# Patient Record
Sex: FEMALE | Race: WHITE | NOT HISPANIC OR LATINO | Employment: FULL TIME | URBAN - METROPOLITAN AREA
[De-identification: names, ages, dates, MRNs, and addresses within clinical notes are randomized per-mention and may not be internally consistent; named-entity substitution may affect disease eponyms.]

---
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
## 2022-07-25 ENCOUNTER — OFFICE VISIT (OUTPATIENT)
Dept: INTERNAL MEDICINE | Age: 22
End: 2022-07-25

## 2022-07-25 VITALS
BODY MASS INDEX: 32.92 KG/M2 | DIASTOLIC BLOOD PRESSURE: 80 MMHG | SYSTOLIC BLOOD PRESSURE: 112 MMHG | WEIGHT: 185.8 LBS | TEMPERATURE: 97.1 F | HEART RATE: 71 BPM | OXYGEN SATURATION: 99 % | HEIGHT: 63 IN

## 2022-07-25 DIAGNOSIS — N92.6 IRREGULAR PERIODS: ICD-10-CM

## 2022-07-25 DIAGNOSIS — Z91.09 ENVIRONMENTAL ALLERGIES: ICD-10-CM

## 2022-07-25 DIAGNOSIS — H69.93 DISORDER OF BOTH EUSTACHIAN TUBES: ICD-10-CM

## 2022-07-25 DIAGNOSIS — Z00.00 ANNUAL PHYSICAL EXAM: Primary | ICD-10-CM

## 2022-07-25 DIAGNOSIS — Z11.59 NEED FOR HEPATITIS C SCREENING TEST: ICD-10-CM

## 2022-07-25 PROBLEM — Z80.3 FAMILY HX-BREAST MALIGNANCY: Status: ACTIVE | Noted: 2021-07-19

## 2022-07-25 PROCEDURE — 99213 OFFICE O/P EST LOW 20 MIN: CPT | Performed by: NURSE PRACTITIONER

## 2022-07-25 PROCEDURE — 99395 PREV VISIT EST AGE 18-39: CPT | Performed by: NURSE PRACTITIONER

## 2022-07-25 RX ORDER — METHYLPREDNISOLONE 4 MG/1
TABLET ORAL
Qty: 21 TABLET | Refills: 0 | Status: SHIPPED | OUTPATIENT
Start: 2022-07-25 | End: 2022-07-26 | Stop reason: SDUPTHER

## 2022-07-25 NOTE — PROGRESS NOTES
"[facility] INTERNAL MEDICINE  ErisMIGUEL Eldridge Bevel / 21 y.o. / female  07/25/2022      VITALS     Visit Vitals  /80 (Cuff Size: Adult)   Pulse 71   Temp 97.1 °F (36.2 °C) (Temporal)   Ht 160 cm (63\")   Wt 84.3 kg (185 lb 12.8 oz)   LMP 07/07/2022 (Exact Date)   SpO2 99%   Breastfeeding No   BMI 32.91 kg/m²       BP Readings from Last 3 Encounters:   07/25/22 112/80   07/13/22 122/70   03/29/22 126/84     Wt Readings from Last 3 Encounters:   07/25/22 84.3 kg (185 lb 12.8 oz) (96 %, Z= 1.70)*   08/14/21 77.1 kg (170 lb) (92 %, Z= 1.44)*   10/31/20 72.1 kg (159 lb) (89 %, Z= 1.24)*     * Growth percentiles are based on CDC (Girls, 2-20 Years) data.      Body mass index is 32.91 kg/m².    MEDICATIONS     Current Outpatient Medications   Medication Sig Dispense Refill   • albuterol sulfate  (90 Base) MCG/ACT inhaler Inhale 2 puffs by mouth every 4-6 hours as needed for wheeze, cough, or shortness of air, and 20-30 minutes before exercise 8.5 g 1   • benzonatate (TESSALON) 200 MG capsule Take 1 capsule by mouth 3 (Three) Times a Day As Needed for Cough. 20 capsule 0   • fluticasone (FLONASE) 50 MCG/ACT nasal spray Use 2 sprays into the nostril(s) as directed by provider Daily. 16 g 0   • naproxen sodium (ANAPROX) 550 MG tablet Take 1 tablet by mouth 2 (Two) Times a Day With Meals. 15 tablet 0   • Norethin-Eth Estrad-Fe Biphas (Lo Loestrin Fe) 1 MG-10 MCG / 10 MCG tablet Take 1 tablet by mouth daily. 84 tablet 5   • pseudoephedrine (Sudafed) 30 MG tablet Take 2 tablets by mouth 3 (Three) Times a Day. 24 tablet 0   • Clascoterone (Winlevi) 1 % cream Apply a thin layer to face twice a day. 60 g 1   • methylPREDNISolone (MEDROL) 4 MG dose pack Take as directed on package instructions. 21 tablet 0     No current facility-administered medications for this visit.       _____________________________________________________________________________________    CHIEF COMPLAINT     Annual Exam, Establish " Care, and Ear Fullness      HISTORY OF PRESENT ILLNESS     Yaritza presents for annual health maintenance visit.    · Last health maintenance visit: more than 3 years ago  · General health: good  · Lifestyle:  · Attempting to lose weight?: Yes   · Diet: eats moderately healthy  · Exercise: has not been as active recently  · Tobacco: Never used   · Alcohol: does not drink  · Work: Full-time  · Reproductive health:  · Sexually active?: No   · Sexual problems?: No problems  · Concern for STD?: No    · Sees Gynecologist?: Yes   · Rosario/Postmenopausal?: No   · Domestic abuse concerns: No   · Depression Screening:      PHQ-2/PHQ-9 Depression Screening 7/25/2022   Little Interest or Pleasure in Doing Things 2-->more than half the days   Feeling Down, Depressed or Hopeless 0-->not at all   Trouble Falling or Staying Asleep, or Sleeping Too Much 1-->several days   Feeling Tired or Having Little Energy 1-->several days   Poor Appetite or Overeating 0-->not at all   Feeling Bad about Yourself - or that You are a Failure or Have Let Yourself or Your Family Down 0-->not at all   Trouble Concentrating on Things, Such as Reading the Newspaper or Watching Television 1-->several days   Moving or Speaking So Slowly that Other People Could Have Noticed? Or the Opposite - Being So Fidgety 0-->not at all   Thoughts that You Would be Better Off Dead or of Hurting Yourself in Some Way 0-->not at all   PHQ-9: Brief Depression Severity Measure Score 5   If You Checked Off Any Problems, How Difficult Have These Problems Made It For You to Do Your Work, Take Care of Things at Home, or Get Along with Other People? somewhat difficult         PHQ-2: 1 (Not depressed)   PHQ-9: 5-9 (Mild Depression)    Patient Care Team:  Eris Man APRN as PCP - General (Internal Medicine)  Lachelle Lezama MD (Obstetrics)  Salome Liz MD (Dermatology)  ______________________________________________________________________    ALLERGIES  Allergies   Allergen  Reactions   • Zithromax [Azithromycin] Swelling        PFSH:     The following portions of the patient's history were reviewed and updated as appropriate: Allergies / Current Medications / Past Medical History / Surgical History / Social History / Family History    PROBLEM LIST   Patient Active Problem List   Diagnosis   • Family hx-breast malignancy   • Irregular periods       PAST MEDICAL HISTORY  Past Medical History:   Diagnosis Date   • Allergic        SURGICAL HISTORY  Past Surgical History:   Procedure Laterality Date   • WISDOM TOOTH EXTRACTION         SOCIAL HISTORY  Social History     Socioeconomic History   • Marital status: Single   Tobacco Use   • Smoking status: Never Smoker   • Smokeless tobacco: Never Used   Vaping Use   • Vaping Use: Never used   Substance and Sexual Activity   • Alcohol use: Never   • Drug use: Never   • Sexual activity: Never       FAMILY HISTORY  Family History   Problem Relation Age of Onset   • Hypertension Mother    • Migraines Mother    • Melanoma Mother    • No Known Problems Father    • Asthma Brother    • Cancer Maternal Grandmother    • Heart disease Paternal Grandfather        IMMUNIZATION HISTORY  Immunization History   Administered Date(s) Administered   • COVID-19 (PFIZER) PURPLE CAP 07/24/2021, 08/14/2021   • Tdap 07/09/2022       ______________________________________________________________________    REVIEW OF SYSTEMS     Review of Systems   Constitutional: Negative.    HENT: Positive for ear pain and hearing loss.    Eyes: Negative.    Respiratory: Negative.    Cardiovascular: Negative.    Gastrointestinal: Negative.    Endocrine: Negative.    Genitourinary: Positive for menstrual problem.   Musculoskeletal: Positive for back pain.   Skin: Negative.    Allergic/Immunologic: Positive for environmental allergies.   Neurological: Negative.    Hematological: Negative.    Psychiatric/Behavioral: Positive for dysphoric mood and sleep disturbance. Negative for  self-injury and suicidal ideas. The patient is nervous/anxious.      PHYSICAL EXAMINATION     Physical Exam  Constitutional:       Appearance: Normal appearance.   HENT:      Head: Normocephalic and atraumatic.      Ears:      Comments: Eustachian disorder bilateral      Nose: Nose normal. No congestion.      Mouth/Throat:      Mouth: Mucous membranes are moist.      Pharynx: Oropharynx is clear.   Eyes:      Conjunctiva/sclera: Conjunctivae normal.      Pupils: Pupils are equal, round, and reactive to light.   Neck:      Thyroid: No thyromegaly.      Vascular: No carotid bruit.   Cardiovascular:      Rate and Rhythm: Normal rate and regular rhythm.      Pulses: Normal pulses.      Heart sounds: Normal heart sounds.   Pulmonary:      Effort: Pulmonary effort is normal.      Breath sounds: Normal breath sounds.   Abdominal:      General: There is no distension.      Palpations: Abdomen is soft.      Tenderness: There is no abdominal tenderness. There is no right CVA tenderness, left CVA tenderness or guarding.   Genitourinary:     Comments: Followed by OB/GYN  Musculoskeletal:         General: Normal range of motion.      Cervical back: Normal range of motion.      Right lower leg: No edema.      Left lower leg: No edema.   Lymphadenopathy:      Cervical: No cervical adenopathy.   Skin:     General: Skin is warm and dry.   Neurological:      Mental Status: She is alert and oriented to person, place, and time.      Cranial Nerves: No cranial nerve deficit.      Motor: No weakness.      Coordination: Coordination normal.      Gait: Gait normal.      Deep Tendon Reflexes: Babinski sign absent on the right side. Babinski sign absent on the left side.      Reflex Scores:       Patellar reflexes are 2+ on the right side and 2+ on the left side.  Psychiatric:         Mood and Affect: Mood normal.         Behavior: Behavior normal.         Thought Content: Thought content normal.         Judgment: Judgment normal.        REVIEWED DATA      Labs:    No results found for: NA, K, CALCIUM, AST, ALT, BUN, CREATININE, EGFRIFNONA, EGFRIFAFRI    No results found for: GLU, HGBA1C, TSH, FREET4    No results found for: LDL, HDL, TRIG, CHOLHDLRATIO    No results found for: TJRC17JE     Lab Results   Component Value Date    WBC 6.24 07/19/2021    HGB 13.6 07/19/2021    MCV 95.4 07/19/2021     07/19/2021       No results found for: PROTEIN, GLUCOSEU, BLOODU, NITRITEU, LEUKOCYTESUR     No results found for: HEPCVIRUSABY    Imaging:        Medical Tests:        ASSESSMENT & PLAN     ANNUAL WELLNESS EXAM / PHYSICAL     Other medical problems addressed today:  Annual physical and to establish care new provider.      No surgical history, history of environmental allergies.    Family history of hypertension, migraines, breast cancer maternal grandmother, heart disease in paternal grandfather.    Never smoker, no alcohol use no drug use.    Take Sudafed, Flonase and albuterol as needed for environmental allergies.    Intermittent lumbar pain, with right-sided radiculopathy.  Has been seen by chiropractor, however due to cost she is discontinued.  Declines physical therapy referral at this time.  Decreasing hours at work, would like to see if this will help first.    Dysphoric mood and anxiety, mild.  No thoughts of suicide or self-harm a panic attack.  Declines medication treatment or therapy at this time.  Would like to work on sleep hygiene as she believes this is the root cause of her symptoms.    Followed by Lachelle Lezama OB/GYN, on Loestrin FE daily with irregular periods.  Family history of breast cancer.  Pelvic ultrasound anteverted UT with normal contour, no fibroids, right ovary simple cyst.  July 2021, lipid panel was performed, CBC and TSH with irregular menses.  , triglycerides of 191 fasting labs.  CBC unremarkable.  TSH 1.690 in normal range.  Never sexually active.    Summary/Discussion:     · Provided lower back  exercises  · Medrol pack for eustachian tube disorder, if no improvement will refer to ENT as she has been on Flonase, Sudafed.  · Decrease/eliminate soda, caffeine, alcohol and overall caloric intake. Reduce carbohydrates and sweets in diet.  Continue to improve dietary habits with lean proteins, fresh vegetables, fruits, and nuts. Improve aerobic exercise: walking/biking/swimming daily as tolerated, recommend 30 minutes/day at least 5 days/week.  · Update annual fasting labs today.  · Declines vaccinations    Next Appointment with me: Visit date not found    Return in about 1 year (around 7/25/2023) for Annual physical.      HEALTHCARE MAINTENANCE ISSUES     Cancer Screening:  · Colon: Initial/Next screening at age: 47  · Repeat colon cancer screening: N/A at this time  · Breast: Recommended monthly self exams; annual professional exam  · Mammogram: N/A at this time  · Cervical: starts at 21   · Skin: Monthly self skin examination, annual exam by health professional  · Lung: Does not meet criteria for lung cancer screening.   · Other:    Screening Labs & Tests:  · Lab results reviewed & discussed with the patient or test orders placed today.  · EKG:  · CV Screening: Lipid panel  · DEXA (65+ or postmenopausal with risk factors):   · HEP C (If born [birth year]27[birth year], or risk factors): Negative screen  · Other:     Immunization/Vaccinations (to be given today unless deferred by patient)  · Influenza:   · Hepatitis A:   · Tetanus/Pertussis:   · Pneumovax:   · Shingles:   · Other:     Lifestyle Counseling:  · Lifestyle Modifications:   · Safety Issues: Always wear seatbelt, Avoid texting while driving   · Use sunscreen, regular skin examination  · Recommended annual dental/vision examination.  · Emotional/Stress/Sleep: Reviewed and  given when appropriate      Health Maintenance   Topic Date Due   • HEPATITIS C SCREENING  Never done   • HPV VACCINES (1 - 2-dose series) 07/25/2022 (Originally 9/20/2013)   • COVID-19  Vaccine (3 - Booster for Pfizer series) 07/27/2022 (Originally 1/14/2022)   • INFLUENZA VACCINE  10/01/2022   • ANNUAL PHYSICAL  07/26/2023   • TDAP/TD VACCINES (2 - Td or Tdap) 07/09/2032   • Pneumococcal Vaccine 0-64  Aged Out   • MENINGOCOCCAL VACCINE  Aged Out         Examiner was wearing KN95 mask, face shield and exam gloves during the entire duration of the visit. Patient was masked the entire time.   Minimum social distance of 6 ft maintained entire visit except if physical contact was necessary as documented.     **Dragon Disclaimer:   Much of this encounter note is an electronic transcription/translation of spoken language to printed text. The electronic translation of spoken language may permit erroneous, or at times, nonsensical words or phrases to be inadvertently transcribed. Although I have reviewed the note for such errors, some may still exist.

## 2022-07-26 NOTE — TELEPHONE ENCOUNTER
Caller: Yaritza Thomas    Relationship: Self    Best call back number: [phone]     Requested Prescriptions:   Requested Prescriptions     Pending Prescriptions Disp Refills   • methylPREDNISolone (MEDROL) 4 MG dose pack 21 tablet 0     Sig: Take as directed on package instructions.        Pharmacy where request should be sent: [facility] [id number] [place] [address] AT [facility] [place] - [phone]  - [phone] FX     Additional details provided by patient: PATIENT STATED THAT THE PREVIOUS REFILL WAS SENT TO A PHARMACY THAT SHE IS UNABLE TO GET TO ANYTIME SOON, WOULD LIKE TO HAVE IT SENT TO LISTED PHARMACY INSTEAD.    Does the patient have less than a 3 day supply:  [x] Yes  [] No    Cintia Vargas Rep   07/26/22 11:52 EDT

## 2022-07-27 ENCOUNTER — PATIENT ROUNDING (BHMG ONLY) (OUTPATIENT)
Dept: INTERNAL MEDICINE | Age: 22
End: 2022-07-27

## 2022-07-27 NOTE — PROGRESS NOTES
A My-Chart message has been sent to the patient for PATIENT ROUNDING with [facility]

## 2022-07-28 DIAGNOSIS — N39.0 URINARY TRACT INFECTION WITHOUT HEMATURIA, SITE UNSPECIFIED: Primary | ICD-10-CM

## 2022-07-28 LAB
ALBUMIN SERPL-MCNC: 4.7 G/DL (ref 3.9–5)
ALBUMIN/GLOB SERPL: 1.7 {RATIO} (ref 1.2–2.2)
ALP SERPL-CCNC: 100 IU/L (ref 42–106)
ALT SERPL-CCNC: 29 IU/L (ref 0–32)
APPEARANCE UR: ABNORMAL
AST SERPL-CCNC: 17 IU/L (ref 0–40)
BACTERIA #/AREA URNS HPF: ABNORMAL /[HPF]
BACTERIA UR CULT: NORMAL
BACTERIA UR CULT: NORMAL
BASOPHILS # BLD AUTO: 0.1 X10E3/UL (ref 0–0.2)
BASOPHILS NFR BLD AUTO: 1 %
BILIRUB SERPL-MCNC: 0.4 MG/DL (ref 0–1.2)
BILIRUB UR QL STRIP: NEGATIVE
BUN SERPL-MCNC: 14 MG/DL (ref 6–20)
BUN/CREAT SERPL: 17 (ref 9–23)
CALCIUM SERPL-MCNC: 9.9 MG/DL (ref 8.7–10.2)
CASTS URNS MICRO: ABNORMAL
CASTS URNS QL MICRO: PRESENT /LPF
CHLORIDE SERPL-SCNC: 106 MMOL/L (ref 96–106)
CHOLEST SERPL-MCNC: 218 MG/DL (ref 100–169)
CHOLEST/HDLC SERPL: 4.8 RATIO (ref 0–4.4)
CO2 SERPL-SCNC: 23 MMOL/L (ref 20–29)
COLOR UR: YELLOW
CREAT SERPL-MCNC: 0.81 MG/DL (ref 0.57–1)
EGFRCR SERPLBLD CKD-EPI 2021: 107 ML/MIN/1.73
EOSINOPHIL # BLD AUTO: 0.1 X10E3/UL (ref 0–0.4)
EOSINOPHIL NFR BLD AUTO: 2 %
EPI CELLS #/AREA URNS HPF: ABNORMAL /HPF (ref 0–10)
ERYTHROCYTE [DISTWIDTH] IN BLOOD BY AUTOMATED COUNT: 11.9 % (ref 11.7–15.4)
GLOBULIN SER CALC-MCNC: 2.7 G/DL (ref 1.5–4.5)
GLUCOSE SERPL-MCNC: 92 MG/DL (ref 65–99)
GLUCOSE UR QL STRIP: NEGATIVE
HBA1C MFR BLD: 5.2 % (ref 4.8–5.6)
HCT VFR BLD AUTO: 40.9 % (ref 34–46.6)
HCV AB S/CO SERPL IA: 0.1 S/CO RATIO (ref 0–0.9)
HDLC SERPL-MCNC: 45 MG/DL
HGB BLD-MCNC: 14.2 G/DL (ref 11.1–15.9)
HGB UR QL STRIP: NEGATIVE
IMM GRANULOCYTES # BLD AUTO: 0 X10E3/UL (ref 0–0.1)
IMM GRANULOCYTES NFR BLD AUTO: 0 %
KETONES UR QL STRIP: NEGATIVE
LDLC SERPL CALC-MCNC: 159 MG/DL (ref 0–109)
LEUKOCYTE ESTERASE UR QL STRIP: ABNORMAL
LYMPHOCYTES # BLD AUTO: 2.1 X10E3/UL (ref 0.7–3.1)
LYMPHOCYTES NFR BLD AUTO: 34 %
MCH RBC QN AUTO: 31.1 PG (ref 26.6–33)
MCHC RBC AUTO-ENTMCNC: 34.7 G/DL (ref 31.5–35.7)
MCV RBC AUTO: 90 FL (ref 79–97)
MICRO URNS: ABNORMAL
MONOCYTES # BLD AUTO: 0.4 X10E3/UL (ref 0.1–0.9)
MONOCYTES NFR BLD AUTO: 7 %
MUCOUS THREADS URNS QL MICRO: PRESENT
NEUTROPHILS # BLD AUTO: 3.4 X10E3/UL (ref 1.4–7)
NEUTROPHILS NFR BLD AUTO: 56 %
NITRITE UR QL STRIP: NEGATIVE
PH UR STRIP: 5.5 [PH] (ref 5–7.5)
PLATELET # BLD AUTO: 332 X10E3/UL (ref 150–450)
POTASSIUM SERPL-SCNC: 4.5 MMOL/L (ref 3.5–5.2)
PROT SERPL-MCNC: 7.4 G/DL (ref 6–8.5)
PROT UR QL STRIP: NEGATIVE
RBC # BLD AUTO: 4.56 X10E6/UL (ref 3.77–5.28)
RBC #/AREA URNS HPF: ABNORMAL /HPF (ref 0–2)
SODIUM SERPL-SCNC: 142 MMOL/L (ref 134–144)
SP GR UR STRIP: 1.02 (ref 1–1.03)
T4 FREE SERPL-MCNC: 1.18 NG/DL (ref 0.93–1.6)
TRIGL SERPL-MCNC: 79 MG/DL (ref 0–89)
TSH SERPL DL<=0.005 MIU/L-ACNC: 1.49 UIU/ML (ref 0.45–4.5)
URINALYSIS REFLEX: ABNORMAL
UROBILINOGEN UR STRIP-MCNC: 0.2 MG/DL (ref 0.2–1)
VLDLC SERPL CALC-MCNC: 14 MG/DL (ref 5–40)
WBC # BLD AUTO: 6.1 X10E3/UL (ref 3.4–10.8)
WBC #/AREA URNS HPF: ABNORMAL /HPF (ref 0–5)

## 2022-07-28 RX ORDER — NITROFURANTOIN 25; 75 MG/1; MG/1
100 CAPSULE ORAL 2 TIMES DAILY
Qty: 10 CAPSULE | Refills: 0 | Status: SHIPPED | OUTPATIENT
Start: 2022-07-28 | End: 2022-08-02

## 2022-07-28 RX ORDER — METHYLPREDNISOLONE 4 MG/1
TABLET ORAL
Qty: 21 TABLET | Refills: 0 | Status: SHIPPED | OUTPATIENT
Start: 2022-07-28 | End: 2023-03-03

## 2022-07-29 ENCOUNTER — TELEPHONE (OUTPATIENT)
Dept: INTERNAL MEDICINE | Age: 22
End: 2022-07-29

## 2022-07-29 NOTE — TELEPHONE ENCOUNTER
HUB MAY READ TO PT  LAB APPT NEEDED. MM    ----- Message from MIGUEL Khan sent at 7/28/2022 12:00 PM EDT -----  Please make lab appointment for urine.     All labs are in acceptable ranges except for the following: It does look like you have a urinary tract infection, however no predominant bacteria was found.  I am going to send in Macrobid to treat, however I would recommend repeating a urine in 1 week for resolution.     Cholesterol is significantly elevated. Decrease/eliminate soda, caffeine, and overall caloric intake. Reduce carbohydrates and sweets in diet.  Continue to improve dietary habits with lean proteins, fresh vegetables, fruits, and nuts. Improve aerobic exercise: walking/biking/swimming daily as tolerated, recommend 30 minutes/day at least 5 days/week.

## 2023-02-02 DIAGNOSIS — R41.840 CONCENTRATION DEFICIT: Primary | ICD-10-CM

## 2023-03-03 ENCOUNTER — LAB (OUTPATIENT)
Dept: LAB | Facility: HOSPITAL | Age: 23
End: 2023-03-03
Payer: COMMERCIAL

## 2023-03-03 ENCOUNTER — OFFICE VISIT (OUTPATIENT)
Dept: INTERNAL MEDICINE | Age: 23
End: 2023-03-03
Payer: COMMERCIAL

## 2023-03-03 VITALS
HEART RATE: 92 BPM | TEMPERATURE: 97.1 F | BODY MASS INDEX: 34.55 KG/M2 | SYSTOLIC BLOOD PRESSURE: 122 MMHG | HEIGHT: 63 IN | OXYGEN SATURATION: 99 % | DIASTOLIC BLOOD PRESSURE: 82 MMHG | WEIGHT: 195 LBS

## 2023-03-03 DIAGNOSIS — M54.50 LUMBAR PAIN WITH RADIATION DOWN RIGHT LEG: ICD-10-CM

## 2023-03-03 DIAGNOSIS — R11.0 NAUSEA: ICD-10-CM

## 2023-03-03 DIAGNOSIS — R10.10 UPPER ABDOMINAL PAIN: ICD-10-CM

## 2023-03-03 DIAGNOSIS — N91.2 AMENORRHEA: Primary | ICD-10-CM

## 2023-03-03 DIAGNOSIS — M79.604 LUMBAR PAIN WITH RADIATION DOWN RIGHT LEG: ICD-10-CM

## 2023-03-03 LAB
ALBUMIN SERPL-MCNC: 4.6 G/DL (ref 3.5–5.2)
ALBUMIN/GLOB SERPL: 1.7 G/DL
ALP SERPL-CCNC: 100 U/L (ref 39–117)
ALT SERPL W P-5'-P-CCNC: 60 U/L (ref 1–33)
AMYLASE SERPL-CCNC: 46 U/L (ref 28–100)
ANION GAP SERPL CALCULATED.3IONS-SCNC: 7.2 MMOL/L (ref 5–15)
AST SERPL-CCNC: 27 U/L (ref 1–32)
BACTERIA UR QL AUTO: ABNORMAL /HPF
BILIRUB SERPL-MCNC: 0.4 MG/DL (ref 0–1.2)
BILIRUB UR QL STRIP: NEGATIVE
BUN SERPL-MCNC: 11 MG/DL (ref 6–20)
BUN/CREAT SERPL: 16.7 (ref 7–25)
CALCIUM SPEC-SCNC: 9.3 MG/DL (ref 8.6–10.5)
CHLORIDE SERPL-SCNC: 104 MMOL/L (ref 98–107)
CLARITY UR: CLEAR
CO2 SERPL-SCNC: 27.8 MMOL/L (ref 22–29)
COLOR UR: YELLOW
CREAT SERPL-MCNC: 0.66 MG/DL (ref 0.57–1)
EGFRCR SERPLBLD CKD-EPI 2021: 129 ML/MIN/1.73
GLOBULIN UR ELPH-MCNC: 2.7 GM/DL
GLUCOSE SERPL-MCNC: 90 MG/DL (ref 65–99)
GLUCOSE UR STRIP-MCNC: NEGATIVE MG/DL
HGB UR QL STRIP.AUTO: NEGATIVE
HYALINE CASTS UR QL AUTO: ABNORMAL /LPF
KETONES UR QL STRIP: NEGATIVE
LEUKOCYTE ESTERASE UR QL STRIP.AUTO: ABNORMAL
LIPASE SERPL-CCNC: 24 U/L (ref 13–60)
NITRITE UR QL STRIP: NEGATIVE
PH UR STRIP.AUTO: 7.5 [PH] (ref 5–8)
POTASSIUM SERPL-SCNC: 4.4 MMOL/L (ref 3.5–5.2)
PROT SERPL-MCNC: 7.3 G/DL (ref 6–8.5)
PROT UR QL STRIP: NEGATIVE
RBC # UR STRIP: ABNORMAL /HPF
REF LAB TEST METHOD: ABNORMAL
SODIUM SERPL-SCNC: 139 MMOL/L (ref 136–145)
SP GR UR STRIP: 1.01 (ref 1–1.03)
SQUAMOUS #/AREA URNS HPF: ABNORMAL /HPF
UROBILINOGEN UR QL STRIP: ABNORMAL
WBC # UR STRIP: ABNORMAL /HPF

## 2023-03-03 PROCEDURE — 36415 COLL VENOUS BLD VENIPUNCTURE: CPT | Performed by: NURSE PRACTITIONER

## 2023-03-03 PROCEDURE — 80053 COMPREHEN METABOLIC PANEL: CPT | Performed by: NURSE PRACTITIONER

## 2023-03-03 PROCEDURE — 87086 URINE CULTURE/COLONY COUNT: CPT | Performed by: NURSE PRACTITIONER

## 2023-03-03 PROCEDURE — 82150 ASSAY OF AMYLASE: CPT | Performed by: NURSE PRACTITIONER

## 2023-03-03 PROCEDURE — 99214 OFFICE O/P EST MOD 30 MIN: CPT | Performed by: NURSE PRACTITIONER

## 2023-03-03 PROCEDURE — 83690 ASSAY OF LIPASE: CPT | Performed by: NURSE PRACTITIONER

## 2023-03-03 PROCEDURE — 81001 URINALYSIS AUTO W/SCOPE: CPT | Performed by: NURSE PRACTITIONER

## 2023-03-03 RX ORDER — CHLORCYCLIZINE HYDROCHLORIDE AND PSEUDOEPHEDRINE HYDROCHLORIDE 25; 60 MG/1; MG/1
TABLET ORAL DAILY
COMMUNITY

## 2023-03-03 NOTE — PROGRESS NOTES
"    I N T E R N A L  M E D I C I N E  DENI ROOT, MIGUEL      ENCOUNTER DATE:  03/03/2023    Yaritza Thomas / 22 y.o. / female      CHIEF COMPLAINT / REASON FOR OFFICE VISIT     Abdominal Pain (Pain since 2/21/2023), Back Pain, and Amenorrhea      ASSESSMENT & PLAN     Problem List Items Addressed This Visit    None  Visit Diagnoses     Amenorrhea    -  Primary    Relevant Orders    Ambulatory Referral to Obstetrics / Gynecology    Lumbar pain with radiation down right leg        Relevant Orders    XR Spine Lumbar 2 or 3 View    Ambulatory Referral to Physical Therapy Evaluate and treat    Nausea        Relevant Orders    Lipase    Amylase    Comprehensive Metabolic Panel    US Abdomen Complete    Urinalysis With Culture If Indicated - Urine, Clean Catch    Upper abdominal pain        Relevant Orders    Lipase    Amylase    Comprehensive Metabolic Panel    US Abdomen Complete    Urinalysis With Culture If Indicated - Urine, Clean Catch        Orders Placed This Encounter   Procedures   • US Abdomen Complete   • XR Spine Lumbar 2 or 3 View   • Lipase   • Amylase   • Comprehensive Metabolic Panel   • Urinalysis With Culture If Indicated - Urine, Clean Catch   • Ambulatory Referral to Physical Therapy Evaluate and treat   • Ambulatory Referral to Obstetrics / Gynecology     No orders of the defined types were placed in this encounter.      SUMMARY/DISCUSSION  •       Next Appointment with me: 8/29/2023    No follow-ups on file.      VITAL SIGNS     Visit Vitals  /82 (Cuff Size: Adult)   Pulse 92   Temp 97.1 °F (36.2 °C) (Temporal)   Ht 160 cm (63\")   Wt 88.5 kg (195 lb)   LMP 12/15/2022 (Exact Date)   SpO2 99%   BMI 34.54 kg/m²     Wt Readings from Last 3 Encounters:   03/03/23 88.5 kg (195 lb)   07/25/22 84.3 kg (185 lb 12.8 oz) (96 %, Z= 1.70)*   08/14/21 77.1 kg (170 lb) (92 %, Z= 1.44)*     * Growth percentiles are based on CDC (Girls, 2-20 Years) data.     Body mass index is 34.54 kg/m².      MEDICATIONS AT THE " TIME OF OFFICE VISIT     Current Outpatient Medications on File Prior to Visit   Medication Sig   • albuterol sulfate  (90 Base) MCG/ACT inhaler Inhale 2 puffs by mouth every 4-6 hours as needed for wheeze, cough, or shortness of air, and 20-30 minutes before exercise   • fluticasone (FLONASE) 50 MCG/ACT nasal spray Use 2 sprays into the nostril(s) as directed by provider Daily.   • Chlorcyclizine-Pseudoephed (Stahist AD) 25-60 MG tablet Daily.   • [DISCONTINUED] benzonatate (TESSALON) 200 MG capsule Take 1 capsule by mouth 3 (Three) Times a Day As Needed for Cough.   • [DISCONTINUED] Clascoterone (Winlevi) 1 % cream Apply a thin layer to face twice a day.   • [DISCONTINUED] methylPREDNISolone (MEDROL) 4 MG dose pack Take as directed on package instructions.   • [DISCONTINUED] naproxen sodium (ANAPROX) 550 MG tablet Take 1 tablet by mouth 2 (Two) Times a Day With Meals.   • [DISCONTINUED] Norethin-Eth Estrad-Fe Biphas (Lo Loestrin Fe) 1 MG-10 MCG / 10 MCG tablet Take 1 tablet by mouth daily.   • [DISCONTINUED] pseudoephedrine (Sudafed) 30 MG tablet Take 2 tablets by mouth 3 (Three) Times a Day.     No current facility-administered medications on file prior to visit.          HISTORY OF PRESENT ILLNESS     Patient originally presented for abdominal pain but also has concerns for lower back pain along with amenorrhea.     Complaint of amenorrhea, not having sexual intercourse, would like referral to new OB/GYN.    Long-term back pain over the last 2 years at least, has seen by chiropractor in the past but this became too expensive.  Has shooting pain down her right lower extremity, around the buttocks with no lower extremity weakness, numbness or tingling or urinary or bowel incontinence.    Mid February she had abdominal cramping burning pain to bilateral upper quadrants.  History of cholelithiasis with gallbladder removal in mother.  No loose stools dark stools or blood in the stool.  Nausea associated with  abdominal pain, postprandial.  No vomiting.  No fever or chills.  No longer having pain.    REVIEW OF SYSTEMS     Constitutional neg except per HPI   Resp neg  CV neg  GI previous nausea, abdominal cramping and bilateral upper quadrant pain, postprandial pain  Musc lumbar pain with right lower extremity radiation   amenorrhea    PHYSICAL EXAMINATION     Physical Exam  Constitutional  No distress  Cardiovascular Rate  normal . Rhythm: regular . Heart sounds:  normal  Pulmonary/Chest  Effort normal. Breath sounds:  normal  Musc lumbar tenderness, neg lower extremity weakness   GI neg distention, tenderness, or rebound   Psychiatric  Alert. Judgment and thought content normal. Mood normal     REVIEWED DATA     Labs:   Lab Results   Component Value Date    GLUCOSE 92 07/25/2022    BUN 14 07/25/2022    CREATININE 0.81 07/25/2022    EGFRRESULT 107 07/25/2022    BCR 17 07/25/2022    K 4.5 07/25/2022    CO2 23 07/25/2022    CALCIUM 9.9 07/25/2022    PROTENTOTREF 7.4 07/25/2022    ALBUMIN 4.7 07/25/2022    LABIL2 1.7 07/25/2022    AST 17 07/25/2022    ALT 29 07/25/2022     Lab Results   Component Value Date    TSH 1.490 07/25/2022     Lab Results   Component Value Date    WBC 6.1 07/25/2022    HGB 14.2 07/25/2022    HCT 40.9 07/25/2022    MCV 90 07/25/2022     07/25/2022     Imaging:           Medical Tests:           Summary of old records / correspondence / consultant report:           Request outside records:             *Examiner was wearing medical surgical mask.   **Dragon dictation used for for documentation.

## 2023-03-04 LAB — BACTERIA SPEC AEROBE CULT: NORMAL

## 2023-03-16 ENCOUNTER — TELEPHONE (OUTPATIENT)
Dept: INTERNAL MEDICINE | Age: 23
End: 2023-03-16
Payer: COMMERCIAL

## 2023-03-16 NOTE — TELEPHONE ENCOUNTER
CALL 1;  [facility] ADVISING XRAY ORDER DID NOT HAVE TO BE SCHEDULED. PT INSTRUCTED TO HAVE XRAY OR CALL OFFICE TO CANCEL ORDER. MM

## 2024-08-26 ENCOUNTER — OFFICE VISIT (OUTPATIENT)
Dept: OBSTETRICS AND GYNECOLOGY | Facility: CLINIC | Age: 24
End: 2024-08-26
Payer: COMMERCIAL

## 2024-08-26 VITALS
WEIGHT: 198 LBS | HEIGHT: 63 IN | BODY MASS INDEX: 35.08 KG/M2 | DIASTOLIC BLOOD PRESSURE: 92 MMHG | SYSTOLIC BLOOD PRESSURE: 148 MMHG

## 2024-08-26 DIAGNOSIS — N89.8 VAGINAL DISCHARGE: ICD-10-CM

## 2024-08-26 DIAGNOSIS — Z01.419 CERVICAL SMEAR, AS PART OF ROUTINE GYNECOLOGICAL EXAMINATION: ICD-10-CM

## 2024-08-26 DIAGNOSIS — Z80.3 FAMILY HX-BREAST MALIGNANCY: ICD-10-CM

## 2024-08-26 DIAGNOSIS — N91.4 SECONDARY OLIGOMENORRHEA: ICD-10-CM

## 2024-08-26 DIAGNOSIS — Z01.419 ROUTINE GYNECOLOGICAL EXAMINATION: Primary | ICD-10-CM

## 2024-08-26 LAB
B-HCG UR QL: NEGATIVE
BILIRUB BLD-MCNC: NEGATIVE MG/DL
CLARITY, POC: CLEAR
COLOR UR: YELLOW
EXPIRATION DATE: NORMAL
GLUCOSE UR STRIP-MCNC: NEGATIVE MG/DL
INTERNAL NEGATIVE CONTROL: NORMAL
INTERNAL POSITIVE CONTROL: NORMAL
KETONES UR QL: NEGATIVE
LEUKOCYTE EST, POC: NEGATIVE
Lab: NORMAL
NITRITE UR-MCNC: NEGATIVE MG/ML
PH UR: 5 [PH] (ref 5–8)
PROT UR STRIP-MCNC: NEGATIVE MG/DL
RBC # UR STRIP: NEGATIVE /UL
SP GR UR: 1 (ref 1–1.03)
UROBILINOGEN UR QL: NORMAL

## 2024-08-26 PROCEDURE — 99214 OFFICE O/P EST MOD 30 MIN: CPT | Performed by: STUDENT IN AN ORGANIZED HEALTH CARE EDUCATION/TRAINING PROGRAM

## 2024-08-26 PROCEDURE — 81025 URINE PREGNANCY TEST: CPT | Performed by: STUDENT IN AN ORGANIZED HEALTH CARE EDUCATION/TRAINING PROGRAM

## 2024-08-26 PROCEDURE — 99385 PREV VISIT NEW AGE 18-39: CPT | Performed by: STUDENT IN AN ORGANIZED HEALTH CARE EDUCATION/TRAINING PROGRAM

## 2024-08-26 PROCEDURE — 81003 URINALYSIS AUTO W/O SCOPE: CPT | Performed by: STUDENT IN AN ORGANIZED HEALTH CARE EDUCATION/TRAINING PROGRAM

## 2024-08-26 RX ORDER — DROSPIRENONE AND ETHINYL ESTRADIOL 0.02-3(28)
1 KIT ORAL DAILY
Qty: 84 TABLET | Refills: 3 | Status: SHIPPED | OUTPATIENT
Start: 2024-08-26

## 2024-08-26 NOTE — ASSESSMENT & PLAN NOTE
Counseled patient that we could consider a workup for PCOS if she desires.  Would recommend follow-up TVUS.  She had one a couple years ago which showed a small follicular cyst but no overt mention of polycystic morphology.  Also discussed that we would conduct fasting lab draw at that same time.  She is interested in hormonal treatment for this at this time though, and discussed drospirenone containing OCPs to help address her acne.  Counseled on cyclic versus continuous dosing, and encouraged continuous dosing.  No contraindications to estrogen containing OCPs.

## 2024-08-26 NOTE — PROGRESS NOTES
GYN Annual Exam     CC- Here for annual exam.     Yaritza Thomas is a 23 y.o. female new patient who presents for annual well woman exam.  Her main symptoms today relate to her lack of regular menses.  She says this has been a lifelong issue for her but generally she will have periods lasting a full week every 5 to 6 months.  She does mention some irregular bleeding episodes in between these periods where she will have darker bloody discharge but for shorter durations.  For most of these bleeding episodes both dark and bright red, she will have some systemic symptoms including mood changes, breast soreness, and pelvic cramping.  She was previously on combined OCPs but found that she was not taking her pills regularly.  She has not seen yet sexually active and denies any other pelvic complaints at this time.  Has never had a PCOS workup; has had increased weight gain over the last couple years and elevated blood pressures today.  Saw dermatologist for acne but denies any other signs of hirsutism including coarse upper lip or chin hairs.  Has a family history of breast cancer but has not undergone any genetic testing to this point.  Non-smoker no recreational drug use.  Otherwise feels well today.      OB History          0    Para   0    Term   0       0    AB   0    Living   0         SAB   0    IAB   0    Ectopic   0    Molar   0    Multiple   0    Live Births   0                Current contraception: none  History of abnormal Pap smear: no  History of abnormal mammogram: no  Family history of uterine, colon or ovarian cancer: no  Family history of breast cancer: yes - maternal grandmother  H/o STDs: none  Last pap:never  Gardasil: Counseled on the Gardasil vaccine today.  Has never received and discussed that vaccination is recommended even without prior sexual activity in order to prevent against future infections.    Health Maintenance   Topic Date Due    Annual Gynecologic Pelvic and Breast Exam   Never done    Pneumococcal Vaccine 0-64 (1 of 2 - PCV) Never done    HPV VACCINES (1 - 3-dose series) Never done    PAP SMEAR  Never done    ANNUAL PHYSICAL  07/25/2023    COVID-19 Vaccine (3 - 2023-24 season) 09/01/2023    BMI FOLLOWUP  03/03/2024    INFLUENZA VACCINE  08/01/2024    TDAP/TD VACCINES (2 - Td or Tdap) 07/09/2032    HEPATITIS C SCREENING  Completed    MENINGOCOCCAL VACCINE  Aged Out       Past Medical History:   Diagnosis Date    Allergic     Headache        Past Surgical History:   Procedure Laterality Date    WISDOM TOOTH EXTRACTION           Current Outpatient Medications:     albuterol sulfate  (90 Base) MCG/ACT inhaler, Inhale 2 puffs by mouth every 4-6 hours as needed for wheeze, cough, or shortness of air, and 20-30 minutes before exercise, Disp: 8.5 g, Rfl: 1    Chlorcyclizine-Pseudoephed (Stahist AD) 25-60 MG tablet, Daily., Disp: , Rfl:     drospirenone-ethinyl estradiol (CAMRON,GIANVI) 3-0.02 MG per tablet, Take 1 tablet by mouth Daily. Continuous dosing, proceed to next pill-pack after completing active pills, Disp: 84 tablet, Rfl: 3    fluticasone (FLONASE) 50 MCG/ACT nasal spray, Use 2 sprays into the nostril(s) as directed by provider Daily., Disp: 16 g, Rfl: 0    Allergies   Allergen Reactions    Zithromax [Azithromycin] Swelling       Social History     Tobacco Use    Smoking status: Never    Smokeless tobacco: Never   Vaping Use    Vaping status: Never Used   Substance Use Topics    Alcohol use: Never    Drug use: Never       Family History   Problem Relation Age of Onset    No Known Problems Father     Hypertension Mother     Migraines Mother     Melanoma Mother     Cancer Mother         melanoma    Mental illness Mother         ADHD    Asthma Brother     Heart disease Paternal Grandfather     Diabetes Paternal Grandmother         Type 2    Breast cancer Maternal Grandmother     Cancer Maternal Grandmother         breast cancer    Hearing loss Maternal Grandfather        Review  "of Systems   Constitutional:  Negative for chills and fever.   Respiratory:  Negative for shortness of breath.    Cardiovascular:  Negative for chest pain.   Gastrointestinal:  Negative for abdominal pain, nausea and vomiting.   Genitourinary:  Positive for vaginal discharge. Negative for dysuria and pelvic pain.   Neurological:  Negative for headaches.       /92   Ht 160 cm (63\")   Wt 89.8 kg (198 lb)   LMP 07/02/2024 (Approximate)   BMI 35.07 kg/m²     Physical Exam  Constitutional:       General: She is not in acute distress.     Appearance: Normal appearance. She is not ill-appearing.   Eyes:      Pupils: Pupils are equal, round, and reactive to light.   Cardiovascular:      Rate and Rhythm: Normal rate.   Pulmonary:      Effort: Pulmonary effort is normal. No respiratory distress.   Abdominal:      General: Abdomen is flat. There is no distension.   Neurological:      General: No focal deficit present.      Mental Status: She is alert.   Psychiatric:         Mood and Affect: Mood normal.         Thought Content: Thought content normal.            Assessment/Plan    # GYN HM: pap    # STD screening: declines   # Contraception:  Initiate today, see below  # Family Planning: family planning: no plans at present  # Diet and Exercise discussed  # Smoking Status: No  # Social: n/a  # Mammogram- plan age 42  # Colon cancer screening 45  # Follow up 1 year or PRN       Diagnoses and all orders for this visit:    1. Routine gynecological examination (Primary)  Comments:  Routine annual exam completed today with Pap smear screening.  Patient not sexually active so gonorrhea & chlamydia screening deferred.  Orders:  -     POC Urinalysis Dipstick, Multipro  -     POC Pregnancy, Urine  -     IGP,CtNgTv,rfx Aptima HPV ASCU    2. Cervical smear, as part of routine gynecological examination  -     IGP,CtNgTv,rfx Aptima HPV ASCU    3. Vaginal discharge  -     NuSwab BV & Candida - Swab, Vagina    4. Secondary " oligomenorrhea  Assessment & Plan:  Counseled patient that we could consider a workup for PCOS if she desires.  Would recommend follow-up TVUS.  She had one a couple years ago which showed a small follicular cyst but no overt mention of polycystic morphology.  Also discussed that we would conduct fasting lab draw at that same time.  She is interested in hormonal treatment for this at this time though, and discussed drospirenone containing OCPs to help address her acne.  Counseled on cyclic versus continuous dosing, and encouraged continuous dosing.  No contraindications to estrogen containing OCPs.    Orders:  -     drospirenone-ethinyl estradiol (CAMRON,GIANVI) 3-0.02 MG per tablet; Take 1 tablet by mouth Daily. Continuous dosing, proceed to next pill-pack after completing active pills  Dispense: 84 tablet; Refill: 3    5. Family hx-breast malignancy      I spent 30 minutes on the separately reported service of abnormal menses and oligomenorrhea. This time is not included in the time used to support the E/M service also reported today.      Shoaib Ni MD  08/26/2024  11:19 EDT

## 2024-08-27 ENCOUNTER — PATIENT ROUNDING (BHMG ONLY) (OUTPATIENT)
Dept: OBSTETRICS AND GYNECOLOGY | Facility: CLINIC | Age: 24
End: 2024-08-27
Payer: COMMERCIAL

## 2024-08-27 NOTE — PROGRESS NOTES
A MY-CHART MESSAGE HAS BEEN SENT TO THE PATIENT FOR PATIENT ROUNDING WITH [facility]     No

## 2024-08-28 LAB
A VAGINAE DNA VAG QL NAA+PROBE: NORMAL SCORE
BVAB2 DNA VAG QL NAA+PROBE: NORMAL SCORE
C ALBICANS DNA VAG QL NAA+PROBE: NEGATIVE
C GLABRATA DNA VAG QL NAA+PROBE: NEGATIVE
C TRACH RRNA CVX QL NAA+PROBE: NEGATIVE
CONV .: NORMAL
CYTOLOGIST CVX/VAG CYTO: NORMAL
CYTOLOGY CVX/VAG DOC CYTO: NORMAL
CYTOLOGY CVX/VAG DOC THIN PREP: NORMAL
DX ICD CODE: NORMAL
Lab: NORMAL
MEGA1 DNA VAG QL NAA+PROBE: NORMAL SCORE
N GONORRHOEA RRNA CVX QL NAA+PROBE: NEGATIVE
OTHER STN SPEC: NORMAL
STAT OF ADQ CVX/VAG CYTO-IMP: NORMAL
T VAGINALIS RRNA SPEC QL NAA+PROBE: NEGATIVE

## 2025-04-28 DIAGNOSIS — N91.4 SECONDARY OLIGOMENORRHEA: ICD-10-CM

## 2025-04-29 RX ORDER — DROSPIRENONE AND ETHINYL ESTRADIOL 0.02-3(28)
1 KIT ORAL DAILY
Qty: 84 TABLET | Refills: 3 | Status: SHIPPED | OUTPATIENT
Start: 2025-04-29